# Patient Record
Sex: MALE | Race: WHITE | NOT HISPANIC OR LATINO | Employment: FULL TIME | URBAN - METROPOLITAN AREA
[De-identification: names, ages, dates, MRNs, and addresses within clinical notes are randomized per-mention and may not be internally consistent; named-entity substitution may affect disease eponyms.]

---
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
## 2022-04-01 RX ORDER — RIZATRIPTAN BENZOATE 10 MG/1
TABLET ORAL
COMMUNITY
Start: 2022-01-05 | End: 2022-08-18

## 2022-04-01 RX ORDER — ERGOCALCIFEROL 1.25 MG/1
CAPSULE ORAL
Qty: 12 CAPSULE | Refills: 2 | Status: SHIPPED | OUTPATIENT
Start: 2022-04-01 | End: 2022-08-18 | Stop reason: SDUPTHER

## 2022-04-01 RX ORDER — CYCLOBENZAPRINE HCL 5 MG
TABLET ORAL
Qty: 30 TABLET | Refills: 2 | Status: SHIPPED | OUTPATIENT
Start: 2022-04-01 | End: 2022-08-18

## 2022-04-01 RX ORDER — OMEPRAZOLE 20 MG/1
CAPSULE, DELAYED RELEASE ORAL
Qty: 30 CAPSULE | Refills: 1 | Status: SHIPPED | OUTPATIENT
Start: 2022-04-01 | End: 2023-01-10

## 2022-04-01 RX ORDER — CYCLOBENZAPRINE HCL 5 MG
TABLET ORAL
COMMUNITY
Start: 2022-01-05 | End: 2022-08-18

## 2022-04-01 RX ORDER — RIZATRIPTAN BENZOATE 10 MG/1
TABLET ORAL
Qty: 9 TABLET | Refills: 1 | Status: SHIPPED | OUTPATIENT
Start: 2022-04-01

## 2022-04-01 RX ORDER — ONDANSETRON 4 MG/1
TABLET, ORALLY DISINTEGRATING ORAL
COMMUNITY
Start: 2022-03-31 | End: 2023-01-10

## 2022-04-01 RX ORDER — AMOXICILLIN 500 MG/1
CAPSULE ORAL
COMMUNITY
Start: 2022-01-03 | End: 2022-08-18

## 2022-04-01 RX ORDER — ERGOCALCIFEROL 1.25 MG/1
CAPSULE ORAL
COMMUNITY
Start: 2022-02-04 | End: 2022-08-18

## 2022-04-01 RX ORDER — IBUPROFEN 800 MG/1
TABLET ORAL
COMMUNITY
Start: 2022-01-03

## 2022-04-01 RX ORDER — HYDROCODONE BITARTRATE AND ACETAMINOPHEN 10; 325 MG/1; MG/1
TABLET ORAL
COMMUNITY
Start: 2022-01-03 | End: 2022-08-18

## 2022-08-18 ENCOUNTER — OFFICE VISIT (OUTPATIENT)
Dept: MEDICAL GROUP | Facility: CLINIC | Age: 22
End: 2022-08-18
Payer: COMMERCIAL

## 2022-08-18 VITALS
HEART RATE: 74 BPM | DIASTOLIC BLOOD PRESSURE: 72 MMHG | OXYGEN SATURATION: 96 % | SYSTOLIC BLOOD PRESSURE: 126 MMHG | BODY MASS INDEX: 31.52 KG/M2 | HEIGHT: 72 IN | WEIGHT: 232.7 LBS

## 2022-08-18 DIAGNOSIS — Z86.39 HISTORY OF VITAMIN D DEFICIENCY: ICD-10-CM

## 2022-08-18 DIAGNOSIS — R73.09 ABNORMAL BLOOD SUGAR: ICD-10-CM

## 2022-08-18 DIAGNOSIS — E78.5 DYSLIPIDEMIA: ICD-10-CM

## 2022-08-18 DIAGNOSIS — G44.209 TENSION HEADACHE: ICD-10-CM

## 2022-08-18 PROBLEM — Z83.438 FAMILY HISTORY OF DYSLIPIDEMIA: Status: ACTIVE | Noted: 2022-08-18

## 2022-08-18 PROBLEM — Z83.438 FAMILY HISTORY OF DYSLIPIDEMIA: Status: RESOLVED | Noted: 2022-08-18 | Resolved: 2022-08-18

## 2022-08-18 PROCEDURE — 99204 OFFICE O/P NEW MOD 45 MIN: CPT | Performed by: STUDENT IN AN ORGANIZED HEALTH CARE EDUCATION/TRAINING PROGRAM

## 2022-08-18 RX ORDER — ALBUTEROL SULFATE 90 UG/1
2 AEROSOL, METERED RESPIRATORY (INHALATION) EVERY 4 HOURS PRN
Qty: 1 EACH | Refills: 3 | Status: SHIPPED | OUTPATIENT
Start: 2022-08-18 | End: 2023-03-26 | Stop reason: SDUPTHER

## 2022-08-18 RX ORDER — ERGOCALCIFEROL 1.25 MG/1
50000 CAPSULE ORAL
Qty: 12 CAPSULE | Refills: 3 | Status: SHIPPED | OUTPATIENT
Start: 2022-08-18 | End: 2023-03-26 | Stop reason: SDUPTHER

## 2022-08-18 RX ORDER — CETIRIZINE HYDROCHLORIDE 10 MG/1
10 TABLET ORAL DAILY
Qty: 90 TABLET | Refills: 3 | Status: SHIPPED | OUTPATIENT
Start: 2022-08-18

## 2022-08-18 SDOH — ECONOMIC STABILITY: TRANSPORTATION INSECURITY
IN THE PAST 12 MONTHS, HAS LACK OF TRANSPORTATION KEPT YOU FROM MEETINGS, WORK, OR FROM GETTING THINGS NEEDED FOR DAILY LIVING?: NO

## 2022-08-18 SDOH — ECONOMIC STABILITY: INCOME INSECURITY: IN THE LAST 12 MONTHS, WAS THERE A TIME WHEN YOU WERE NOT ABLE TO PAY THE MORTGAGE OR RENT ON TIME?: NO

## 2022-08-18 SDOH — HEALTH STABILITY: PHYSICAL HEALTH: ON AVERAGE, HOW MANY MINUTES DO YOU ENGAGE IN EXERCISE AT THIS LEVEL?: 120 MIN

## 2022-08-18 SDOH — HEALTH STABILITY: PHYSICAL HEALTH: ON AVERAGE, HOW MANY DAYS PER WEEK DO YOU ENGAGE IN MODERATE TO STRENUOUS EXERCISE (LIKE A BRISK WALK)?: 7 DAYS

## 2022-08-18 SDOH — ECONOMIC STABILITY: HOUSING INSECURITY
IN THE LAST 12 MONTHS, WAS THERE A TIME WHEN YOU DID NOT HAVE A STEADY PLACE TO SLEEP OR SLEPT IN A SHELTER (INCLUDING NOW)?: NO

## 2022-08-18 SDOH — ECONOMIC STABILITY: FOOD INSECURITY: WITHIN THE PAST 12 MONTHS, YOU WORRIED THAT YOUR FOOD WOULD RUN OUT BEFORE YOU GOT MONEY TO BUY MORE.: PATIENT DECLINED

## 2022-08-18 SDOH — ECONOMIC STABILITY: FOOD INSECURITY: WITHIN THE PAST 12 MONTHS, THE FOOD YOU BOUGHT JUST DIDN'T LAST AND YOU DIDN'T HAVE MONEY TO GET MORE.: PATIENT DECLINED

## 2022-08-18 SDOH — ECONOMIC STABILITY: HOUSING INSECURITY: IN THE LAST 12 MONTHS, HOW MANY PLACES HAVE YOU LIVED?: 1

## 2022-08-18 SDOH — ECONOMIC STABILITY: TRANSPORTATION INSECURITY
IN THE PAST 12 MONTHS, HAS LACK OF RELIABLE TRANSPORTATION KEPT YOU FROM MEDICAL APPOINTMENTS, MEETINGS, WORK OR FROM GETTING THINGS NEEDED FOR DAILY LIVING?: NO

## 2022-08-18 SDOH — ECONOMIC STABILITY: TRANSPORTATION INSECURITY
IN THE PAST 12 MONTHS, HAS THE LACK OF TRANSPORTATION KEPT YOU FROM MEDICAL APPOINTMENTS OR FROM GETTING MEDICATIONS?: NO

## 2022-08-18 SDOH — ECONOMIC STABILITY: INCOME INSECURITY: HOW HARD IS IT FOR YOU TO PAY FOR THE VERY BASICS LIKE FOOD, HOUSING, MEDICAL CARE, AND HEATING?: PATIENT DECLINED

## 2022-08-18 SDOH — HEALTH STABILITY: MENTAL HEALTH
STRESS IS WHEN SOMEONE FEELS TENSE, NERVOUS, ANXIOUS, OR CAN'T SLEEP AT NIGHT BECAUSE THEIR MIND IS TROUBLED. HOW STRESSED ARE YOU?: TO SOME EXTENT

## 2022-08-18 ASSESSMENT — SOCIAL DETERMINANTS OF HEALTH (SDOH)
HOW OFTEN DO YOU ATTEND CHURCH OR RELIGIOUS SERVICES?: PATIENT DECLINED
ARE YOU MARRIED, WIDOWED, DIVORCED, SEPARATED, NEVER MARRIED, OR LIVING WITH A PARTNER?: NEVER MARRIED
HOW OFTEN DO YOU ATTEND CHURCH OR RELIGIOUS SERVICES?: PATIENT DECLINED
DO YOU BELONG TO ANY CLUBS OR ORGANIZATIONS SUCH AS CHURCH GROUPS UNIONS, FRATERNAL OR ATHLETIC GROUPS, OR SCHOOL GROUPS?: NO
IN A TYPICAL WEEK, HOW MANY TIMES DO YOU TALK ON THE PHONE WITH FAMILY, FRIENDS, OR NEIGHBORS?: THREE TIMES A WEEK
HOW OFTEN DO YOU GET TOGETHER WITH FRIENDS OR RELATIVES?: TWICE A WEEK
HOW HARD IS IT FOR YOU TO PAY FOR THE VERY BASICS LIKE FOOD, HOUSING, MEDICAL CARE, AND HEATING?: PATIENT DECLINED
HOW OFTEN DO YOU ATTENT MEETINGS OF THE CLUB OR ORGANIZATION YOU BELONG TO?: PATIENT DECLINED
ARE YOU MARRIED, WIDOWED, DIVORCED, SEPARATED, NEVER MARRIED, OR LIVING WITH A PARTNER?: NEVER MARRIED
HOW MANY DRINKS CONTAINING ALCOHOL DO YOU HAVE ON A TYPICAL DAY WHEN YOU ARE DRINKING: PATIENT DECLINED
HOW OFTEN DO YOU HAVE SIX OR MORE DRINKS ON ONE OCCASION: PATIENT DECLINED
HOW OFTEN DO YOU ATTENT MEETINGS OF THE CLUB OR ORGANIZATION YOU BELONG TO?: PATIENT DECLINED
DO YOU BELONG TO ANY CLUBS OR ORGANIZATIONS SUCH AS CHURCH GROUPS UNIONS, FRATERNAL OR ATHLETIC GROUPS, OR SCHOOL GROUPS?: NO
IN A TYPICAL WEEK, HOW MANY TIMES DO YOU TALK ON THE PHONE WITH FAMILY, FRIENDS, OR NEIGHBORS?: THREE TIMES A WEEK
HOW OFTEN DO YOU GET TOGETHER WITH FRIENDS OR RELATIVES?: TWICE A WEEK
WITHIN THE PAST 12 MONTHS, YOU WORRIED THAT YOUR FOOD WOULD RUN OUT BEFORE YOU GOT THE MONEY TO BUY MORE: PATIENT DECLINED
HOW OFTEN DO YOU HAVE A DRINK CONTAINING ALCOHOL: PATIENT DECLINED

## 2022-08-18 ASSESSMENT — LIFESTYLE VARIABLES
HOW OFTEN DO YOU HAVE SIX OR MORE DRINKS ON ONE OCCASION: PATIENT DECLINED
HOW MANY STANDARD DRINKS CONTAINING ALCOHOL DO YOU HAVE ON A TYPICAL DAY: PATIENT DECLINED
AUDIT-C TOTAL SCORE: -1
HOW OFTEN DO YOU HAVE A DRINK CONTAINING ALCOHOL: PATIENT DECLINED
SKIP TO QUESTIONS 9-10: 0

## 2022-08-19 ASSESSMENT — ENCOUNTER SYMPTOMS
FOCAL WEAKNESS: 0
EYE REDNESS: 0
PALPITATIONS: 0
NAUSEA: 0
ABDOMINAL PAIN: 0
VOMITING: 0
HEADACHES: 1
SPEECH CHANGE: 0
CONSTIPATION: 0
WEIGHT LOSS: 0
FEVER: 0
PHOTOPHOBIA: 0
DIARRHEA: 0
SHORTNESS OF BREATH: 0
SENSORY CHANGE: 0
COUGH: 0
WEAKNESS: 0
EYE DISCHARGE: 0

## 2022-08-19 NOTE — PROGRESS NOTES
"Subjective:     CC:  Establish care    Diagnoses of Tension headache, BMI 31.0-31.9,adult, History of vitamin D deficiency, Dyslipidemia, and Abnormal blood sugar were pertinent to this visit.    HISTORY OF THE PRESENT ILLNESS: Patient is a 21 y.o. male. This pleasant patient is here today to establish care and discuss headaches, medication refills, and lab work. His/her prior PCP was unkown.    Problem   Tension Headache    Patient with chronic headaches, previously diagnosed as migraine headaches  Reports frequency of a couple times per month to a couple times per week  Has not noted any obvious triggers  Headaches are bilateral starting in the frontal area and moving to the occipital area with quality of \"inflammation\".  There is no associated nausea, emesis, photophobia, or phonophobia  Has tried multiple migraine meds including triptans without relief.  States that anti-inflammatory such as Advil seem to be the most effective for relieving headaches.     History of Vitamin D Deficiency    H/o vitamin D deficiency, takes 50,000 IU vitamin D weekly       Dyslipidemia    History of abnormal lipid panel     Bmi 31.0-31.9,Adult    Reviewed diet and physical activity  Regularly active with walking and lifting items at work      Abnormal Blood Sugar    H/o abnormal blood sugar  Patient asymptomatic, but concerned about risk of DM  BMI 31     Family History of Dyslipidemia (Resolved)       Past Medical History:   Diagnosis Date    Asthma     Dental disorder     loose tooth upper R     Tension headache 8/18/2022   Seasonal allergies    Current Outpatient Medications on File Prior to Visit   Medication Sig Dispense Refill    rizatriptan (MAXALT) 10 MG tablet 1 BY MOUTH AS NEEDED MIGRAINE 9 Tablet 1    omeprazole (PRILOSEC) 20 MG delayed-release capsule TAKE ONE CAPSULE BY MOUTH DAILY 30 Capsule 1    ibuprofen (MOTRIN) 800 MG Tab       ondansetron (ZOFRAN ODT) 4 MG TABLET DISPERSIBLE       loratadine (CLARITIN) 10 MG TABS " Take 10 mg by mouth every day.       No current facility-administered medications on file prior to visit.     Past Surgical History:   Procedure Laterality Date    TONSILLECTOMY AND ADENOIDECTOMY  7/16/2014    Performed by Miguel Oden M.D. at SURGERY SAME DAY [facility]    OTHER  2007    Hypospadias repair      History reviewed. No pertinent family history.    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   HENT:  Positive for congestion. Negative for ear pain.    Eyes:  Negative for photophobia, discharge and redness.   Respiratory:  Negative for cough and shortness of breath.    Cardiovascular:  Negative for chest pain and palpitations.   Gastrointestinal:  Negative for abdominal pain, constipation, diarrhea, nausea and vomiting.   Genitourinary:  Negative for dysuria, frequency and urgency.   Musculoskeletal:  Negative for joint pain.   Skin:  Negative for rash.   Neurological:  Positive for headaches. Negative for sensory change, speech change, focal weakness and weakness.       Objective:       Exam: /72 (BP Location: Right arm, Patient Position: Sitting, BP Cuff Size: Adult)   Pulse 74   Ht 1.829 m (6')   Wt 106 kg (232 lb 11.2 oz)   SpO2 96%  Body mass index is 31.56 kg/m².    Physical Exam  Vitals reviewed.   Constitutional:       General: He is not in acute distress.     Appearance: Normal appearance.   HENT:      Head: Normocephalic and atraumatic.      Right Ear: Tympanic membrane, ear canal and external ear normal.      Left Ear: Tympanic membrane, ear canal and external ear normal.      Nose: Nose normal.      Mouth/Throat:      Mouth: Mucous membranes are moist.      Pharynx: Oropharynx is clear. No  oropharyngeal exudate or posterior oropharyngeal erythema.      Comments: Tonsils surgically absent  Eyes:      Conjunctiva/sclera: Conjunctivae normal.      Pupils: Pupils are equal, round, and reactive to light.   Cardiovascular:      Rate and Rhythm: Normal rate and regular rhythm.      Pulses: Normal pulses.      Heart sounds: Normal heart sounds. No murmur heard.  Pulmonary:      Effort: Pulmonary effort is normal. No respiratory distress.      Breath sounds: Normal breath sounds. No wheezing, rhonchi or rales.   Abdominal:      General: Abdomen is flat. Bowel sounds are normal. There is no distension.      Palpations: Abdomen is soft. There is no mass.      Tenderness: There is no abdominal tenderness. There is no guarding.      Hernia: No hernia is present.   Musculoskeletal:         General: No swelling or deformity.      Cervical back: Normal range of motion and neck supple. No tenderness.   Lymphadenopathy:      Cervical: No cervical adenopathy.   Skin:     General: Skin is warm and dry.      Capillary Refill: Capillary refill takes less than 2 seconds.      Findings: No lesion or rash.   Neurological:      General: No focal deficit present.      Mental Status: He is alert.      Sensory: No sensory deficit.      Motor: No weakness.      Gait: Gait normal.   Psychiatric:         Mood and Affect: Mood normal.         Behavior: Behavior normal.         Thought Content: Thought content normal.           Assessment & Plan:   21 y.o. male with the following -    Problem List Items Addressed This Visit       Tension headache     Discontinue migraine medications  Okay to use acetaminophen or NSAIDs as needed           History of vitamin D deficiency     25-hydroxy vitamin D level per patient request and history         Relevant Orders    VITAMIN D,25 HYDROXY    Dyslipidemia     - lipid panel ordered         Relevant Orders    Lipid Profile    BMI 31.0-31.9,adult     Counseled patient about recommendations for  physical activity and heart healthy diet         Relevant Orders    HEMOGLOBIN A1C    Lipid Profile    Comp Metabolic Panel    Abnormal blood sugar     Hemoglobin A1C         Relevant Orders    HEMOGLOBIN A1C           No follow-ups on file.    RTC to review labs if abnormal; otherwise, needs annual visit or visits PRN

## 2022-09-23 ENCOUNTER — OFFICE VISIT (OUTPATIENT)
Dept: MEDICAL GROUP | Facility: CLINIC | Age: 22
End: 2022-09-23
Payer: COMMERCIAL

## 2022-09-23 VITALS
HEIGHT: 72 IN | DIASTOLIC BLOOD PRESSURE: 70 MMHG | SYSTOLIC BLOOD PRESSURE: 120 MMHG | TEMPERATURE: 97.8 F | OXYGEN SATURATION: 96 % | HEART RATE: 82 BPM | WEIGHT: 236.2 LBS | BODY MASS INDEX: 31.99 KG/M2

## 2022-09-23 DIAGNOSIS — R73.03 PRE-DIABETES: ICD-10-CM

## 2022-09-23 DIAGNOSIS — E78.1 HYPERTRIGLYCERIDEMIA: ICD-10-CM

## 2022-09-23 DIAGNOSIS — Z86.39 HISTORY OF VITAMIN D DEFICIENCY: ICD-10-CM

## 2022-09-23 PROCEDURE — 99214 OFFICE O/P EST MOD 30 MIN: CPT | Mod: GC | Performed by: STUDENT IN AN ORGANIZED HEALTH CARE EDUCATION/TRAINING PROGRAM

## 2022-09-23 RX ORDER — ATORVASTATIN CALCIUM 40 MG/1
40 TABLET, FILM COATED ORAL NIGHTLY
Qty: 90 TABLET | Refills: 3 | Status: SHIPPED | OUTPATIENT
Start: 2022-09-23 | End: 2023-07-24 | Stop reason: SDUPTHER

## 2022-09-23 NOTE — PROGRESS NOTES
Subjective:     CC: Lab follow up    HPI:   Atilio presents today with:    Problem   Hypertriglyceridemia    History of hyerglycemia noted in pediatric visits. Cholesterol panel ordered at last visit for preventative care reveals elevated TG to 742, HDL 20, and .    Patient is not currently taking any medications that may produce this side effect.     He has a family history of hypercholesterolemia in father and grandmother, however they initiated therapy for it in their 50's.     No history of early heart attack or stroke.      Pre-Diabetes    A1C 5.7 with history of elevated blood glucose in the past.      History of Vitamin D Deficiency    H/o vitamin D deficiency, takes 50,000 IU vitamin D weekly but has not taken in 9 months.     Levels low at 11.1           Current Outpatient Medications Ordered in Epic   Medication Sig Dispense Refill    cetirizine (ZYRTEC) 10 MG Tab Take 1 Tablet by mouth every day. 90 Tablet 3    vitamin D2, Ergocalciferol, (DRISDOL) 1.25 MG (70599 UT) Cap capsule Take 1 Capsule by mouth every 7 days. 12 Capsule 3    albuterol 108 (90 Base) MCG/ACT Aero Soln inhalation aerosol Inhale 2 Puffs every four hours as needed for Shortness of Breath. 1 Each 3    rizatriptan (MAXALT) 10 MG tablet 1 BY MOUTH AS NEEDED MIGRAINE 9 Tablet 1    omeprazole (PRILOSEC) 20 MG delayed-release capsule TAKE ONE CAPSULE BY MOUTH DAILY 30 Capsule 1    ibuprofen (MOTRIN) 800 MG Tab       ondansetron (ZOFRAN ODT) 4 MG TABLET DISPERSIBLE       loratadine (CLARITIN) 10 MG TABS Take 10 mg by mouth every day.       No current [facility]-ordered facility-administered medications on file.       ROS:  Gen: no fevers/chills, no changes in weight  Eyes: no changes in vision  ENT: no sore throat, no hearing loss, no bloody nose  Pulm: no sob, no cough  CV: no chest pain, no palpitations  GI: no nausea/vomiting, no diarrhea  : no dysuria  MSk: no myalgias  Skin: no rash  Neuro: no headaches, no  numbness/tingling  Heme/Lymph: no easy bruising      Objective:     Exam:  /70 (BP Location: Right arm, Patient Position: Sitting, BP Cuff Size: Adult)   Pulse 82   Temp 36.6 °C (97.8 °F) (Temporal)   Ht 1.829 m (6')   Wt 107 kg (236 lb 3.2 oz)   SpO2 96%   BMI 32.03 kg/m²  Body mass index is 32.03 kg/m².    Gen: Alert and oriented, No apparent distress.  Neck: Neck is supple without lymphadenopathy.  Lungs: Normal effort, CTA bilaterally, no wheezes, rhonchi, or rales  CV: Regular rate and rhythm. No murmurs, rubs, or gallops.  Ext: No clubbing, cyanosis, edema.      Labs:   Lab Results   Component Value Date/Time    CHOLSTRLTOT 197 09/20/2022 08:07 AM    HDL 20 (L) 09/20/2022 08:07 AM    TRIGLYCERIDE 742 (HH) 09/20/2022 08:07 AM       Lab Results   Component Value Date/Time    SODIUM 141 09/20/2022 08:07 AM    POTASSIUM 4.4 09/20/2022 08:07 AM    CHLORIDE 102 09/20/2022 08:07 AM    CO2 23 09/20/2022 08:07 AM    GLUCOSE 78 09/20/2022 08:07 AM    BUN 22 (H) 09/20/2022 08:07 AM    CREATININE 1.20 09/20/2022 08:07 AM    BUNCREATRAT 18 09/20/2022 08:07 AM     Lab Results   Component Value Date/Time    ALKPHOSPHAT 81 09/20/2022 08:07 AM    ASTSGOT 26 09/20/2022 08:07 AM    ALTSGPT 35 09/20/2022 08:07 AM    TBILIRUBIN <0.2 09/20/2022 08:07 AM      Vitamin D level is low at 11.1    A1C 5.7      Assessment & Plan:     21 y.o. male with the following -     Problem List Items Addressed This Visit       History of vitamin D deficiency     Counseled patient on importance of continuing vitamin D supplementation.          Hypertriglyceridemia     Hypertriglyceridemia at age 21 years old is highly suggestive of genetic/familial disease.     Discussed at length lifestyle modifications including initiating of daily exercise at least 50 min/week of vigorous exercise or 75 min per week of moderate intensity exercise.     Discussed dietary modifications in detail highlighting decrease in fat intake, saturated fat intake,  and decrease in frequency of meals eaten out as well as basics of reading nutrition labels. I have placed referral for nutritionist consultation.     Referral to genetic studies HNP and [facility]    Initiation of atorvastatin 40 mg    Consider addition of fibrate and or omega 3 FA if not responsive to escalating doses of statin         Relevant Orders    Referral to Nutrition Services    Referral to Genetic Research Studies    Pre-diabetes     See lifestyle counseling under A&P for hypertriglyceridemia

## 2022-09-23 NOTE — ASSESSMENT & PLAN NOTE
Hypertriglyceridemia at age 21 years old is highly suggestive of genetic/familial disease.     Discussed at length lifestyle modifications including initiating of daily exercise at least 50 min/week of vigorous exercise or 75 min per week of moderate intensity exercise.     Discussed dietary modifications in detail highlighting decrease in fat intake, saturated fat intake, and decrease in frequency of meals eaten out as well as basics of reading nutrition labels. I have placed referral for nutritionist consultation.     Referral to genetic studies HNP and [facility]    Initiation of atorvastatin 40 mg    Consider addition of fibrate and or omega 3 FA if not responsive to escalating doses of statin

## 2022-09-26 ENCOUNTER — RESEARCH ENCOUNTER (OUTPATIENT)
Dept: MEDICAL GROUP | Facility: PHYSICIAN GROUP | Age: 22
End: 2022-09-26
Attending: STUDENT IN AN ORGANIZED HEALTH CARE EDUCATION/TRAINING PROGRAM
Payer: COMMERCIAL

## 2022-09-26 DIAGNOSIS — Z00.6 RESEARCH STUDY PATIENT: ICD-10-CM

## 2022-09-26 DIAGNOSIS — E78.1 HYPERTRIGLYCERIDEMIA: ICD-10-CM

## 2022-09-26 NOTE — LETTER
January 6, 2023         Atilio Grier III  [address] [facility] [address]        Dear Atilio:      Below are the results from your recent visit:    No results found from the In Basket message.  The test results show that ***.    If you have any questions or concerns, please don't hesitate to call.        Sincerely,      Liane Escalante M.D.    Electronically Signed

## 2022-09-30 DIAGNOSIS — E78.1 HYPERTRIGLYCERIDEMIA: ICD-10-CM

## 2022-09-30 NOTE — PROGRESS NOTES
Patient is having trouble acquiring the lab orders from our visit on 9/26/2020    I have placed another set of orders for lipid panels; one to be completed as soon as patient is able, and a second order to be completed after starting the statin therapy for a few weeks prior to our next appointment.     I have also placed an order for the CBC such that the patient may participate in the [employer] [place] [facility] and [facility] study.     These are the labs discussed in the appointment and they are not urgent.

## 2022-10-20 LAB
APOB+LDLR+PCSK9 GENE MUT ANL BLD/T: NOT DETECTED
BRCA1+BRCA2 DEL+DUP + FULL MUT ANL BLD/T: NOT DETECTED
MLH1+MSH2+MSH6+PMS2 GN DEL+DUP+FUL M: NOT DETECTED

## 2022-11-09 ENCOUNTER — OFFICE VISIT (OUTPATIENT)
Dept: MEDICAL GROUP | Facility: CLINIC | Age: 22
End: 2022-11-09
Payer: COMMERCIAL

## 2022-11-09 VITALS
OXYGEN SATURATION: 95 % | BODY MASS INDEX: 30.69 KG/M2 | DIASTOLIC BLOOD PRESSURE: 79 MMHG | WEIGHT: 226.6 LBS | HEART RATE: 62 BPM | SYSTOLIC BLOOD PRESSURE: 132 MMHG | HEIGHT: 72 IN

## 2022-11-09 DIAGNOSIS — E78.1 HYPERTRIGLYCERIDEMIA: ICD-10-CM

## 2022-11-09 PROCEDURE — 99212 OFFICE O/P EST SF 10 MIN: CPT | Performed by: STUDENT IN AN ORGANIZED HEALTH CARE EDUCATION/TRAINING PROGRAM

## 2022-11-09 NOTE — ASSESSMENT & PLAN NOTE
Encourage continuing atorvastatin 40 mg  Repeat lipid panel in December  Discussed lifestyle modifications that can improve as discussed under BMI 30.0-30.9

## 2022-11-10 ASSESSMENT — ENCOUNTER SYMPTOMS
FEVER: 0
SHORTNESS OF BREATH: 0
COUGH: 0
SENSORY CHANGE: 0
SORE THROAT: 0
FOCAL WEAKNESS: 0

## 2022-11-10 NOTE — PROGRESS NOTES
Subjective:     CC: f/u hypertriglyceridemia    HPI:   Atilio presents today with    Problem   Hypertriglyceridemia    History of hyerglycemia noted in pediatric visits. Cholesterol panel ordered at last visit for preventative care reveals elevated TG to 742, HDL 20, and .    Patient started on atorvastatin 40 mg at prior appt and tolerating.  Repeat lipid panel improved with T, HDL 26, , LDL 70    He has a family history of hypercholesterolemia in father and grandmother, however they initiated therapy for it in their 50's.     No history of early heart attack or stroke.      Bmi 30.0-30.9,Adult    Reviewed diet and physical activity  Regularly active with walking and lifting items at work   Made dietary changes at home, but eating fast food outside the home. Denies sugar-sweetened beverages, regular snacking, or other highly-processed foods other than fast food for meals outside of home.           ROS:  Review of Systems   Constitutional:  Negative for fever.   HENT:  Negative for congestion and sore throat.    Respiratory:  Negative for cough and shortness of breath.    Cardiovascular:  Negative for chest pain and leg swelling.   Neurological:  Negative for sensory change and focal weakness.     Objective:     Exam:  /79 (BP Location: Right arm, Patient Position: Sitting, BP Cuff Size: Adult)   Pulse 62   Ht 1.829 m (6')   Wt 103 kg (226 lb 9.6 oz)   SpO2 95%   BMI 30.73 kg/m²  Body mass index is 30.73 kg/m².    Physical Exam  Constitutional:       General: He is not in acute distress.     Appearance: Normal appearance. He is not ill-appearing.   HENT:      Right Ear: External ear normal.      Left Ear: External ear normal.   Eyes:      Conjunctiva/sclera: Conjunctivae normal.   Pulmonary:      Effort: Pulmonary effort is normal. No respiratory distress.   Skin:     Coloration: Skin is not pale.      Findings: No rash.   Neurological:      General: No focal deficit present.      Mental  Status: He is alert.      Gait: Gait normal.   Psychiatric:         Mood and Affect: Mood normal.         Behavior: Behavior normal.         Thought Content: Thought content normal.         Judgment: Judgment normal.         Assessment & Plan:     22 y.o. male with the following -     Problem List Items Addressed This Visit       BMI 30.0-30.9,adult     - Recommend heart healthy diet, try limiting fast food.  - Continue activity at work, try to incorporate more regular physical activity throughout day         Hypertriglyceridemia     Encourage continuing atorvastatin 40 mg  Repeat lipid panel in December  Discussed lifestyle modifications that can improve as discussed under BMI 30.0-30.9                    No follow-ups on file.

## 2022-11-10 NOTE — ASSESSMENT & PLAN NOTE
- Recommend heart healthy diet, try limiting fast food.  - Continue activity at work, try to incorporate more regular physical activity throughout day

## 2022-12-19 ENCOUNTER — OFFICE VISIT (OUTPATIENT)
Dept: MEDICAL GROUP | Facility: CLINIC | Age: 22
End: 2022-12-19
Payer: COMMERCIAL

## 2022-12-19 VITALS
HEART RATE: 92 BPM | TEMPERATURE: 98.2 F | BODY MASS INDEX: 31.92 KG/M2 | HEIGHT: 71 IN | DIASTOLIC BLOOD PRESSURE: 80 MMHG | OXYGEN SATURATION: 95 % | SYSTOLIC BLOOD PRESSURE: 122 MMHG | WEIGHT: 228 LBS

## 2022-12-19 DIAGNOSIS — E78.5 DYSLIPIDEMIA: ICD-10-CM

## 2022-12-19 DIAGNOSIS — E78.1 HYPERTRIGLYCERIDEMIA: ICD-10-CM

## 2022-12-19 PROCEDURE — 99213 OFFICE O/P EST LOW 20 MIN: CPT | Mod: GE | Performed by: STUDENT IN AN ORGANIZED HEALTH CARE EDUCATION/TRAINING PROGRAM

## 2022-12-19 ASSESSMENT — PATIENT HEALTH QUESTIONNAIRE - PHQ9: CLINICAL INTERPRETATION OF PHQ2 SCORE: 0

## 2022-12-20 NOTE — PROGRESS NOTES
Subjective:     CC: Lab follow up    HPI:   Atilio presents today with:    Problem   Hypertriglyceridemia    History of hyerglycemia noted in pediatric visits. Cholesterol panel  shows elevated TG to 742, HDL 20, and .    Patient started on atorvastatin 40 mg at prior appt and tolerating.  Repeat lipid panel improved with T, HDL 26, , LDL 70    He has a family history of hypercholesterolemia in father and grandmother, however they initiated therapy for it in their 50's.     No history of early heart attack or stroke.     Genetic studies completed and are negative for heritable familial hypercholesterolemia. Repeat labs show overall worsening    Latest Reference Range & Units 22 11:47   Cholesterol,Tot 100 - 199 mg/dL 185   Triglycerides 0 - 149 mg/dL 242 (H)   HDL >39 mg/dL 26 (L)   LDL Chol Calc (NIH) 0 - 99 mg/dL 116 (H)   VLDL Cholesterol Calc 5 - 40 mg/dL 43 (H)   (H): Data is abnormally high  (L): Data is abnormally low    Patient reports prior to this lab draw he had self discontinued his medication to see if he really did indeed need to take it.          Current Outpatient Medications Ordered in Epic   Medication Sig Dispense Refill    atorvastatin (LIPITOR) 40 MG Tab Take 1 Tablet by mouth every evening. 90 Tablet 3    cetirizine (ZYRTEC) 10 MG Tab Take 1 Tablet by mouth every day. 90 Tablet 3    vitamin D2, Ergocalciferol, (DRISDOL) 1.25 MG (13990 UT) Cap capsule Take 1 Capsule by mouth every 7 days. 12 Capsule 3    albuterol 108 (90 Base) MCG/ACT Aero Soln inhalation aerosol Inhale 2 Puffs every four hours as needed for Shortness of Breath. 1 Each 3    rizatriptan (MAXALT) 10 MG tablet 1 BY MOUTH AS NEEDED MIGRAINE 9 Tablet 1    ibuprofen (MOTRIN) 800 MG Tab        No current Epic-ordered facility-administered medications on file.         ROS:  Gen: no fevers/chills, no changes in weight  Eyes: no changes in vision  ENT: no sore throat, no hearing loss, no bloody nose  Pulm: no  "sob, no cough  CV: no chest pain, no palpitations  GI: no nausea/vomiting, no diarrhea  : no dysuria  MSk: no myalgias  Skin: no rash  Neuro: no headaches, no numbness/tingling  Heme/Lymph: no easy bruising      Objective:     Exam:  /80   Pulse 92   Temp 36.8 °C (98.2 °F)   Ht 1.803 m (5' 11\")   Wt 103 kg (228 lb)   SpO2 95%   BMI 31.80 kg/m²  Body mass index is 31.8 kg/m².    Gen: Alert and oriented, No apparent distress.  Neck: Neck is supple without lymphadenopathy.  Lungs: Normal effort, CTA bilaterally, no wheezes, rhonchi, or rales  CV: Regular rate and rhythm. No murmurs, rubs, or gallops.  Ext: No clubbing, cyanosis, edema.      Labs:    Latest Reference Range & Units 12/13/22 11:47   Cholesterol,Tot 100 - 199 mg/dL 185   Triglycerides 0 - 149 mg/dL 242 (H)   HDL >39 mg/dL 26 (L)   LDL Chol Calc (NIH) 0 - 99 mg/dL 116 (H)   VLDL Cholesterol Calc 5 - 40 mg/dL 43 (H)   (H): Data is abnormally high  (L): Data is abnormally low    Assessment & Plan:     22 y.o. male with the following -     Problem List Items Addressed This Visit       Dyslipidemia    Relevant Orders    CHOLESTEROL    Hypertriglyceridemia     Restart Atorvastatin 40 mg daily. Room to increase to 80mg daily if not effective although I suspect with compliance he will have measurable success given his initial response to medication. Patient now feels good about taking medication daily. He would not like to add on any adjunct therapies at this time.     We discussed Niacin as an option should low HDL become a pervasive issue in the future. Plans to follow up with dietitian/nutritionist as detailed in previous visits. Has not yet seen but now has an appointment. Reinforced lifestyle changes at today's visit.     If control remains difficult despite dietary/lifestyle modification and statin medication we could consider referral to vascular specialist for further workup.             Return in about 3 months (around 3/19/2023).    "

## 2022-12-20 NOTE — ASSESSMENT & PLAN NOTE
Restart Atorvastatin 40 mg daily. Room to increase to 80mg daily if not effective although I suspect with compliance he will have measurable success given his initial response to medication. Patient now feels good about taking medication daily. He would not like to add on any adjunct therapies at this time.     We discussed Niacin as an option should low HDL become a pervasive issue in the future. Plans to follow up with dietitian/nutritionist as detailed in previous visits. Has not yet seen but now has an appointment. Reinforced lifestyle changes at today's visit.     If control remains difficult despite dietary/lifestyle modification and statin medication we could consider referral to vascular specialist for further workup.

## 2023-02-24 NOTE — PROGRESS NOTES
Attempting to close this very old encounter than Dr. Mary has been unable to clear from her inbox.

## 2023-03-17 ENCOUNTER — HOSPITAL ENCOUNTER (OUTPATIENT)
Dept: LAB | Facility: MEDICAL CENTER | Age: 23
End: 2023-03-17
Attending: STUDENT IN AN ORGANIZED HEALTH CARE EDUCATION/TRAINING PROGRAM
Payer: COMMERCIAL

## 2023-03-17 DIAGNOSIS — E78.5 DYSLIPIDEMIA: ICD-10-CM

## 2023-03-17 DIAGNOSIS — E78.1 HYPERTRIGLYCERIDEMIA: ICD-10-CM

## 2023-03-17 LAB
ALBUMIN SERPL BCP-MCNC: 4.6 G/DL (ref 3.2–4.9)
ALBUMIN/GLOB SERPL: 1.8 G/DL
ALP SERPL-CCNC: 79 U/L (ref 30–99)
ALT SERPL-CCNC: 34 U/L (ref 2–50)
ANION GAP SERPL CALC-SCNC: 9 MMOL/L (ref 7–16)
AST SERPL-CCNC: 31 U/L (ref 12–45)
BASOPHILS # BLD AUTO: 0.8 % (ref 0–1.8)
BASOPHILS # BLD: 0.05 K/UL (ref 0–0.12)
BILIRUB SERPL-MCNC: 0.2 MG/DL (ref 0.1–1.5)
BUN SERPL-MCNC: 13 MG/DL (ref 8–22)
CALCIUM ALBUM COR SERPL-MCNC: 8.8 MG/DL (ref 8.5–10.5)
CALCIUM SERPL-MCNC: 9.3 MG/DL (ref 8.5–10.5)
CHLORIDE SERPL-SCNC: 106 MMOL/L (ref 96–112)
CHOLEST SERPL-MCNC: 166 MG/DL (ref 100–199)
CO2 SERPL-SCNC: 23 MMOL/L (ref 20–33)
CREAT SERPL-MCNC: 0.66 MG/DL (ref 0.5–1.4)
EOSINOPHIL # BLD AUTO: 0.09 K/UL (ref 0–0.51)
EOSINOPHIL NFR BLD: 1.4 % (ref 0–6.9)
ERYTHROCYTE [DISTWIDTH] IN BLOOD BY AUTOMATED COUNT: 43.2 FL (ref 35.9–50)
GFR SERPLBLD CREATININE-BSD FMLA CKD-EPI: 136 ML/MIN/1.73 M 2
GLOBULIN SER CALC-MCNC: 2.5 G/DL (ref 1.9–3.5)
GLUCOSE SERPL-MCNC: 97 MG/DL (ref 65–99)
HCT VFR BLD AUTO: 48.1 % (ref 42–52)
HDLC SERPL-MCNC: 26 MG/DL
HGB BLD-MCNC: 16.4 G/DL (ref 14–18)
IMM GRANULOCYTES # BLD AUTO: 0.03 K/UL (ref 0–0.11)
IMM GRANULOCYTES NFR BLD AUTO: 0.5 % (ref 0–0.9)
LDLC SERPL CALC-MCNC: 80 MG/DL
LYMPHOCYTES # BLD AUTO: 2.24 K/UL (ref 1–4.8)
LYMPHOCYTES NFR BLD: 35.8 % (ref 22–41)
MCH RBC QN AUTO: 31 PG (ref 27–33)
MCHC RBC AUTO-ENTMCNC: 34.1 G/DL (ref 33.7–35.3)
MCV RBC AUTO: 90.9 FL (ref 81.4–97.8)
MONOCYTES # BLD AUTO: 0.39 K/UL (ref 0–0.85)
MONOCYTES NFR BLD AUTO: 6.2 % (ref 0–13.4)
NEUTROPHILS # BLD AUTO: 3.46 K/UL (ref 1.82–7.42)
NEUTROPHILS NFR BLD: 55.3 % (ref 44–72)
NRBC # BLD AUTO: 0 K/UL
NRBC BLD-RTO: 0 /100 WBC
PLATELET # BLD AUTO: 245 K/UL (ref 164–446)
PMV BLD AUTO: 11.6 FL (ref 9–12.9)
POTASSIUM SERPL-SCNC: 4.2 MMOL/L (ref 3.6–5.5)
PROT SERPL-MCNC: 7.1 G/DL (ref 6–8.2)
RBC # BLD AUTO: 5.29 M/UL (ref 4.7–6.1)
SODIUM SERPL-SCNC: 138 MMOL/L (ref 135–145)
TRIGL SERPL-MCNC: 301 MG/DL (ref 0–149)
WBC # BLD AUTO: 6.3 K/UL (ref 4.8–10.8)

## 2023-03-17 PROCEDURE — 80053 COMPREHEN METABOLIC PANEL: CPT

## 2023-03-17 PROCEDURE — 85025 COMPLETE CBC W/AUTO DIFF WBC: CPT

## 2023-03-17 PROCEDURE — 80061 LIPID PANEL: CPT

## 2023-03-24 ENCOUNTER — TELEMEDICINE (OUTPATIENT)
Dept: MEDICAL GROUP | Facility: CLINIC | Age: 23
End: 2023-03-24
Payer: COMMERCIAL

## 2023-03-24 DIAGNOSIS — E78.1 HYPERTRIGLYCERIDEMIA: ICD-10-CM

## 2023-03-24 PROCEDURE — 99442 PR PHYSICIAN TELEPHONE EVALUATION 11-20 MIN: CPT | Mod: GE | Performed by: STUDENT IN AN ORGANIZED HEALTH CARE EDUCATION/TRAINING PROGRAM

## 2023-03-24 NOTE — ASSESSMENT & PLAN NOTE
Continue atorvastatin 40  Discussed compliance and lifestyle modifications   Still plans to attend nutrition visit (referral placed at prior visits)     RTC in 3-6 months

## 2023-03-24 NOTE — LETTER
March 24, 2023    To whom it may concern,    Atilio Grier was evaluated by myself at [facility] clinic on 03/24 for an ongoing illness. He should be excused from work for the dates fo 3/18/23 through 3/31/23 for this illness.     If you have any questions please feel free to reach out to my office.                                 Liane Escalante M.D.

## 2023-03-24 NOTE — PROGRESS NOTES
Virtual Visit: New Patient   This visit was conducted via  telephone    The patient was in their home in the [facility].    The patient's identity was confirmed and verbal consent was obtained for this virtual visit.     This call lasted 15 minutes    Subjective:     CC: hyperlipidemia  Atilio Grier III is a 22 y.o. male presenting to establish care and to discuss the evaluation and management of:    History of hyerglycemia noted in pediatric visits. Cholesterol panel  shows elevated TG to 742, HDL 20, and .    Patient started on atorvastatin 40 mg and tolerating well.  Repeat lipid panel improved with T, HDL 26, , LDL 70. Most recently TG at 302. Some inconsistencies in taking medication. Still working on lifestyle modifications and admits that his diet is not where it should be    He has a family history of hypercholesterolemia in father and grandmother, however they initiated therapy for it in their 50's.     No history of early heart attack or stroke.     Genetic studies completed and are negative for heritable familial hypercholesterolemia.         ROS  See HPI    Current medicines (including changes today)  Current Outpatient Medications   Medication Sig Dispense Refill    atorvastatin (LIPITOR) 40 MG Tab Take 1 Tablet by mouth every evening. 90 Tablet 3    cetirizine (ZYRTEC) 10 MG Tab Take 1 Tablet by mouth every day. 90 Tablet 3    vitamin D2, Ergocalciferol, (DRISDOL) 1.25 MG (60803 UT) Cap capsule Take 1 Capsule by mouth every 7 days. 12 Capsule 3    albuterol 108 (90 Base) MCG/ACT Aero Soln inhalation aerosol Inhale 2 Puffs every four hours as needed for Shortness of Breath. 1 Each 3    rizatriptan (MAXALT) 10 MG tablet 1 BY MOUTH AS NEEDED MIGRAINE 9 Tablet 1    ibuprofen (MOTRIN) 800 MG Tab        No current facility-administered medications for this visit.       Patient Active Problem List    Diagnosis Date Noted    Hypertriglyceridemia 2022    Pre-diabetes  09/23/2022    Tension headache 08/18/2022    History of vitamin D deficiency 08/18/2022    Dyslipidemia 08/18/2022    BMI 30.0-30.9,adult 08/18/2022    Abnormal blood sugar 08/18/2022    Hypertrophy of tonsils with hypertrophy of adenoids 07/16/2014        Objective:   Labs: Reviewed all with patient     Latest Reference Range & Units 03/17/23 15:04   WBC 4.8 - 10.8 K/uL 6.3   RBC 4.70 - 6.10 M/uL 5.29   Hemoglobin 14.0 - 18.0 g/dL 16.4   Hematocrit 42.0 - 52.0 % 48.1   MCV 81.4 - 97.8 fL 90.9   MCH 27.0 - 33.0 pg 31.0   MCHC 33.7 - 35.3 g/dL 34.1   RDW 35.9 - 50.0 fL 43.2   Platelet Count 164 - 446 K/uL 245   MPV 9.0 - 12.9 fL 11.6   Neutrophils-Polys 44.00 - 72.00 % 55.30   Neutrophils (Absolute) 1.82 - 7.42 K/uL 3.46   Lymphocytes 22.00 - 41.00 % 35.80   Lymphs (Absolute) 1.00 - 4.80 K/uL 2.24   Monocytes 0.00 - 13.40 % 6.20   Monos (Absolute) 0.00 - 0.85 K/uL 0.39   Eosinophils 0.00 - 6.90 % 1.40   Eos (Absolute) 0.00 - 0.51 K/uL 0.09   Basophils 0.00 - 1.80 % 0.80   Baso (Absolute) 0.00 - 0.12 K/uL 0.05   Immature Granulocytes 0.00 - 0.90 % 0.50   Immature Granulocytes (abs) 0.00 - 0.11 K/uL 0.03   Nucleated RBC /100 WBC 0.00   NRBC (Absolute) K/uL 0.00   Sodium 135 - 145 mmol/L 138   Potassium 3.6 - 5.5 mmol/L 4.2   Chloride 96 - 112 mmol/L 106   Co2 20 - 33 mmol/L 23   Anion Gap 7.0 - 16.0  9.0   Glucose 65 - 99 mg/dL 97   Bun 8 - 22 mg/dL 13   Creatinine 0.50 - 1.40 mg/dL 0.66   GFR (CKD-EPI) >60 mL/min/1.73 m 2 136   Calcium 8.5 - 10.5 mg/dL 9.3   Correct Calcium 8.5 - 10.5 mg/dL 8.8   AST(SGOT) 12 - 45 U/L 31   ALT(SGPT) 2 - 50 U/L 34   Alkaline Phosphatase 30 - 99 U/L 79   Total Bilirubin 0.1 - 1.5 mg/dL 0.2   Albumin 3.2 - 4.9 g/dL 4.6   Total Protein 6.0 - 8.2 g/dL 7.1   Globulin 1.9 - 3.5 g/dL 2.5   A-G Ratio g/dL 1.8   Cholesterol,Tot 100 - 199 mg/dL 166   Triglycerides 0 - 149 mg/dL 301 (H)   HDL >=40 mg/dL 26 !   LDL <100 mg/dL 80   (H): Data is abnormally high  !: Data is abnormal    Assessment and  Plan:   The following treatment plan was discussed:     1. Hypertriglyceridemia  Doing well. Reinforced lifestyle modifications  Continue atorvastatin 40  Discussed compliance and lifestyle modifications   Still plans to attend nutrition visit (referral placed at prior visits)     RTC in 3-6 months     This call lasted 15 minutes

## 2023-03-28 RX ORDER — ALBUTEROL SULFATE 90 UG/1
2 AEROSOL, METERED RESPIRATORY (INHALATION) EVERY 4 HOURS PRN
Qty: 1 EACH | Refills: 3 | Status: SHIPPED | OUTPATIENT
Start: 2023-03-28

## 2023-03-28 RX ORDER — ERGOCALCIFEROL 1.25 MG/1
50000 CAPSULE ORAL
Qty: 12 CAPSULE | Refills: 3 | Status: SHIPPED | OUTPATIENT
Start: 2023-03-28 | End: 2023-05-18 | Stop reason: SDUPTHER

## 2023-05-18 RX ORDER — ERGOCALCIFEROL 1.25 MG/1
50000 CAPSULE ORAL
Qty: 12 CAPSULE | Refills: 3 | Status: SHIPPED | OUTPATIENT
Start: 2023-05-18

## 2023-06-22 ENCOUNTER — OFFICE VISIT (OUTPATIENT)
Dept: MEDICAL GROUP | Facility: CLINIC | Age: 23
End: 2023-06-22
Payer: COMMERCIAL

## 2023-06-22 DIAGNOSIS — Z13.1 SCREENING FOR DIABETES MELLITUS: ICD-10-CM

## 2023-06-22 DIAGNOSIS — E78.1 HYPERTRIGLYCERIDEMIA: ICD-10-CM

## 2023-06-22 DIAGNOSIS — Z83.49 FAMILY HISTORY OF HYPOTHYROIDISM: ICD-10-CM

## 2023-06-22 PROCEDURE — 99213 OFFICE O/P EST LOW 20 MIN: CPT | Mod: GE | Performed by: STUDENT IN AN ORGANIZED HEALTH CARE EDUCATION/TRAINING PROGRAM

## 2023-06-22 ASSESSMENT — FIBROSIS 4 INDEX: FIB4 SCORE: 0.46

## 2023-06-22 ASSESSMENT — PATIENT HEALTH QUESTIONNAIRE - PHQ9: CLINICAL INTERPRETATION OF PHQ2 SCORE: 0

## 2023-06-23 NOTE — ASSESSMENT & PLAN NOTE
Labs ordered for next visit. Follow up in 3 months.   Screening for T2DM and thyroid also ordered    Consider fenofibrate in the future if TG still elevated despite statin, diet and lifestyle modifications

## 2023-06-23 NOTE — PROGRESS NOTES
Subjective:     CC: follow up    HPI:   Atilio presents today with:    Problem   Hypertriglyceridemia    History of hyerglycemia noted in pediatric visits. Cholesterol panel 09/22 shows elevated TG to 742, HDL 20, and .    Patient started on atorvastatin 40 mg and tolerating well.  Most recently TG at 302. Some inconsistencies in taking medication. Still working on lifestyle modifications and admits that his diet is not where it should be, but it has been consistently improving. He has noted 10 lbs weight loss since starting his new job which keeps him very active.     He has a family history of hypercholesterolemia in father and grandmother, however they initiated therapy for it in their 50's.     No history of early heart attack or stroke.     Genetic studies completed and are negative for heritable familial hypercholesterolemia.              Current Outpatient Medications Ordered in Epic   Medication Sig Dispense Refill    vitamin D2, Ergocalciferol, (DRISDOL) 1.25 MG (31807 UT) Cap capsule Take 1 Capsule by mouth every 7 days. 12 Capsule 3    albuterol 108 (90 Base) MCG/ACT Aero Soln inhalation aerosol Inhale 2 Puffs every four hours as needed for Shortness of Breath. 1 Each 3    atorvastatin (LIPITOR) 40 MG Tab Take 1 Tablet by mouth every evening. 90 Tablet 3    rizatriptan (MAXALT) 10 MG tablet 1 BY MOUTH AS NEEDED MIGRAINE 9 Tablet 1    cetirizine (ZYRTEC) 10 MG Tab Take 1 Tablet by mouth every day. 90 Tablet 3    ibuprofen (MOTRIN) 800 MG Tab  (Patient not taking: Reported on 6/22/2023)       No current [facility]-ordered facility-administered medications on file.         ROS:  Gen: no fevers/chills, no changes in weight  Eyes: no changes in vision  ENT: no sore throat, no hearing loss, no bloody nose  Pulm: no sob, no cough  CV: no chest pain, no palpitations  GI: no nausea/vomiting, no diarrhea  : no dysuria  MSk: no myalgias  Skin: no rash  Neuro: no headaches, no numbness/tingling  Heme/Lymph: no easy  "bruising      Objective:     Exam:  BP (P) 122/76 (BP Location: Left arm, Patient Position: Sitting, BP Cuff Size: Adult)   Pulse (P) 71   Temp (P) 36.6 °C (97.8 °F) (Temporal)   Ht (P) 1.778 m (5' 10\")   Wt (P) 96.6 kg (213 lb)   SpO2 (P) 95%   BMI (P) 30.56 kg/m²  Body mass index is 30.56 kg/m² (pended).    Gen: Alert and oriented, No apparent distress.  Neck: Neck is supple without lymphadenopathy.  Lungs: Normal effort, CTA bilaterally, no wheezes, rhonchi, or rales  CV: Regular rate and rhythm. No murmurs, rubs, or gallops.  Ext: No clubbing, cyanosis, edema.      Labs:    Latest Reference Range & Units 03/17/23 15:04   WBC 4.8 - 10.8 K/uL 6.3   RBC 4.70 - 6.10 M/uL 5.29   Hemoglobin 14.0 - 18.0 g/dL 16.4   Hematocrit 42.0 - 52.0 % 48.1   MCV 81.4 - 97.8 fL 90.9   MCH 27.0 - 33.0 pg 31.0   MCHC 33.7 - 35.3 g/dL 34.1   RDW 35.9 - 50.0 fL 43.2   Platelet Count 164 - 446 K/uL 245   MPV 9.0 - 12.9 fL 11.6   Neutrophils-Polys 44.00 - 72.00 % 55.30   Neutrophils (Absolute) 1.82 - 7.42 K/uL 3.46   Lymphocytes 22.00 - 41.00 % 35.80   Lymphs (Absolute) 1.00 - 4.80 K/uL 2.24   Monocytes 0.00 - 13.40 % 6.20   Monos (Absolute) 0.00 - 0.85 K/uL 0.39   Eosinophils 0.00 - 6.90 % 1.40   Eos (Absolute) 0.00 - 0.51 K/uL 0.09   Basophils 0.00 - 1.80 % 0.80   Baso (Absolute) 0.00 - 0.12 K/uL 0.05   Immature Granulocytes 0.00 - 0.90 % 0.50   Immature Granulocytes (abs) 0.00 - 0.11 K/uL 0.03   Nucleated RBC /100 WBC 0.00   NRBC (Absolute) K/uL 0.00   Sodium 135 - 145 mmol/L 138   Potassium 3.6 - 5.5 mmol/L 4.2   Chloride 96 - 112 mmol/L 106   Co2 20 - 33 mmol/L 23   Anion Gap 7.0 - 16.0  9.0   Glucose 65 - 99 mg/dL 97   Bun 8 - 22 mg/dL 13   Creatinine 0.50 - 1.40 mg/dL 0.66   GFR (CKD-EPI) >60 mL/min/1.73 m 2 136   Calcium 8.5 - 10.5 mg/dL 9.3   Correct Calcium 8.5 - 10.5 mg/dL 8.8   AST(SGOT) 12 - 45 U/L 31   ALT(SGPT) 2 - 50 U/L 34   Alkaline Phosphatase 30 - 99 U/L 79   Total Bilirubin 0.1 - 1.5 mg/dL 0.2   Albumin 3.2 - " 4.9 g/dL 4.6   Total Protein 6.0 - 8.2 g/dL 7.1   Globulin 1.9 - 3.5 g/dL 2.5   A-G Ratio g/dL 1.8   Cholesterol,Tot 100 - 199 mg/dL 166   Triglycerides 0 - 149 mg/dL 301 (H)   HDL >=40 mg/dL 26 !   LDL <100 mg/dL 80   (H): Data is abnormally high  !: Data is abnormal    Assessment & Plan:     22 y.o. male with the following -     Problem List Items Addressed This Visit       Hypertriglyceridemia     Labs ordered for next visit. Follow up in 3 months.   Screening for T2DM and thyroid also ordered    Consider fenofibrate in the future if TG still elevated despite statin, diet and lifestyle modifications         Relevant Orders    Lipid Profile     Other Visit Diagnoses       Family history of hypothyroidism        Relevant Orders    TSH WITH REFLEX TO FT4    Screening for diabetes mellitus        Relevant Orders    HEMOGLOBIN A1C

## 2023-07-23 ENCOUNTER — PATIENT MESSAGE (OUTPATIENT)
Dept: OBGYN | Facility: CLINIC | Age: 23
End: 2023-07-23
Payer: COMMERCIAL

## 2023-07-24 RX ORDER — ATORVASTATIN CALCIUM 40 MG/1
40 TABLET, FILM COATED ORAL NIGHTLY
Qty: 90 TABLET | Refills: 3 | Status: SHIPPED | OUTPATIENT
Start: 2023-07-24

## 2023-08-01 ENCOUNTER — OFFICE VISIT (OUTPATIENT)
Dept: MEDICAL GROUP | Facility: CLINIC | Age: 23
End: 2023-08-01
Payer: COMMERCIAL

## 2023-08-01 VITALS — BODY MASS INDEX: 28.58 KG/M2 | WEIGHT: 204.9 LBS

## 2023-08-01 DIAGNOSIS — E78.1 HYPERTRIGLYCERIDEMIA: ICD-10-CM

## 2023-08-01 DIAGNOSIS — R07.89 CHEST WALL DISCOMFORT: ICD-10-CM

## 2023-08-01 PROCEDURE — 99213 OFFICE O/P EST LOW 20 MIN: CPT | Mod: GE

## 2023-08-01 ASSESSMENT — FIBROSIS 4 INDEX: FIB4 SCORE: 0.46

## 2023-08-01 NOTE — PROGRESS NOTES
Subjective:     CC: lab follow-up and chest discomfort     HPI:   Atilio presents today with follow-up on labs for his hypertriglyceridemia and complaints of chest discomfort ongoing for 3-4 years.     Patient was diagnosed with hypertriglyceridemia in September 2022.  At that time his triglycerides were elevated to 792 and he was started on atorvastatin 40 mg.  Reports in September 2022 he has not been taking his atorvastatin regularly.  He states that he would take it every other day to once a week.  States that after his last visit in June 2023 patient was taking medication daily.  He will take the medication in the morning on an empty stomach.  Patient said that he has stopped taking his medication 1 week ago after he had his labs completed on July 17, 2023.  Reports that he stopped taking the medications due to symptoms of nausea vomiting and muscle aches that he experienced during the month of taking it consistently.  Patient states that he needs a doctor note for missing work due to these symptoms.     Patient reports that he has been trying to make lifestyle changes by eating healthier and exercising. He has lost 9 lbs in the last month. However, patient reports that he is only eating one meal a day at dinner time, consisting of meat, vegetable and carbohydrate. He states he rarely is eating snacks and when he does it is often mixed nuts or a bag of chips. Patient does not recall how long he has only been eating one meal.     Patient reports a chest discomfort. He states that he is having popping of his ribs around the sternum with associated pain. It has been ongoing for 3-4 years. Reports trauma of falling onto open light source 4 years ago. Reports that rubbing his chest and stretching relieves the pain. Has not seen a doctor for it.  Reports that laying on his chest makes it worse. Denies chest pain, palpitations, radiation of pain or pain with activity.      Current Outpatient Medications Ordered in  Epic   Medication Sig Dispense Refill    vitamin D2, Ergocalciferol, (DRISDOL) 1.25 MG (46587 UT) Cap capsule Take 1 Capsule by mouth every 7 days. 12 Capsule 3    albuterol 108 (90 Base) MCG/ACT Aero Soln inhalation aerosol Inhale 2 Puffs every four hours as needed for Shortness of Breath. 1 Each 3    rizatriptan (MAXALT) 10 MG tablet 1 BY MOUTH AS NEEDED MIGRAINE 9 Tablet 1    atorvastatin (LIPITOR) 40 MG Tab Take 1 Tablet by mouth every evening. (Patient not taking: Reported on 2023) 90 Tablet 3    cetirizine (ZYRTEC) 10 MG Tab Take 1 Tablet by mouth every day. (Patient not taking: Reported on 2023) 90 Tablet 3    ibuprofen (MOTRIN) 800 MG Tab  (Patient not taking: Reported on 2023)       No current [facility]-ordered facility-administered medications on file.     Family History   Problem Relation Age of Onset    Hypertension Mother     Hyperlipidemia Father     Colon Cancer Father     Hyperlipidemia Maternal Grandmother     Hypertension Maternal Grandmother     Hyperlipidemia Maternal Grandfather     Hypertension Maternal Grandfather       Past Surgical History:   Procedure Laterality Date    TONSILLECTOMY AND ADENOIDECTOMY  2014    Performed by Miguel Oden M.D. at [facility]    OTHER  2007    Hypospadias repair      Social History     Tobacco Use    Smoking status: Former     Packs/day: 0.50     Types: Cigarettes     Quit date: 2019     Years since quittin.5   Vaping Use    Vaping Use: Some days    Substances: Nicotine   Substance Use Topics    Alcohol use: Yes     Alcohol/week: 2.4 oz     Types: 4 Standard drinks or equivalent per week     Comment: social    Drug use: Yes     Frequency: 6.0 times per week     Types: Marijuana     Comment: smoking        ROS:  Gen: no fevers/chills, no changes in weight  Pulm: no sob, no cough  CV: no chest pain, np palpitations  GI: no nausea/vomiting, no diarrhea    Objective:     Exam:  BP (P) 104/68 (BP Location: Left arm, Patient  "Position: Sitting, BP Cuff Size: Adult)   Pulse (!) (P) 55   Temp (P) 36.3 °C (97.4 °F) (Temporal)   Resp (P) 16   Ht (P) 1.803 m (5' 11\")   Wt 92.9 kg (204 lb 14.4 oz)   SpO2 (P) 96%   BMI (P) 28.58 kg/m²  Body mass index is 28.58 kg/m² (pended).    Gen: Alert and oriented, No apparent distress.  Neck: Neck is supple without lymphadenopathy.  Lungs: Normal effort, CTA bilaterally, no wheezes, rhonchi, or rales  CV: Regular rate and rhythm. No murmurs, rubs, or gallops.  Chest:  No deformities present, pain not present on exam today   Ext: No clubbing, cyanosis, edema.    Labs:   7/19/23   Total cholesterol 88  Triglycerides 137   HDL 27   VLDL 24   LDL 37     A1C  5.4    TSH 0.896    Assessment & Plan:     22 y.o. male with the following -       Chest wall discomfort  Patient reports chest discomfort at sternum when laying down or at random times for 3-4 years. History of trauma to the area 4 years ago with no follow-up. Patient denies chest pain, radiation of pain, palpitations or pain with activity. Pain is likely musculoskeletal in origin.  - Discussed continue stretching for discomfort relief   - Can use over the counter Voltaren gel as needed for discomfort due to likely musculoskeletal origin   - Patient declined chest x-ray      Hypertriglyceridemia  Triglycerides 137 on the 7/19/23. Patient has not been consistently taking atorvastatin in the past. Did take consistently for one month. Is taking it in the morning on an empty stomach.   - Continue taking atorvastatin 40 mg, discussed taking it in the evening after a meal   - Patient does not need fenofibrate as triglycerides are managed with daily atorvastatin use  - Discussed lifestyle and diet modifications, food log to encourage patient to eat more than one meal a day   - Follow-up in 2 months to review medication tolerance and diet        Return in about 2 months (around 10/1/2023).    Destinee Lara M.D.  PGY1          "

## 2023-08-01 NOTE — LETTER
August 1, 2023    To Whom It May Concern:         This is confirmation that Atilio NIÑO Marvel COVARRUBIAS attended his scheduled appointment with Destinee Lara M.D. on 8/01/23. Patient was sick on 7/30/23 and 7/31. He can return to work on 8/2/23.          If you have any questions please do not hesitate to call me at the phone number listed below.    Sincerely,          Destinee Lara M.D.  [phone]

## 2023-08-01 NOTE — ASSESSMENT & PLAN NOTE
Patient reports chest discomfort at sternum when laying down or at random times for 3-4 years. History of trauma to the area 4 years ago with no follow-up. Patient denies chest pain, radiation of pain, palpitations or pain with activity. Pain is likely musculoskeletal in origin.  - Discussed continue stretching for discomfort relief   - Can use over the counter Voltaren gel as needed for discomfort due to likely musculoskeletal origin   - Patient declined chest x-ray

## 2023-08-01 NOTE — ASSESSMENT & PLAN NOTE
Triglycerides 137 on the 7/19/23. Patient has not been consistently taking atorvastatin in the past. Did take consistently for one month. Is taking it in the morning on an empty stomach.   - Continue taking atorvastatin 40 mg, discussed taking it in the evening after a meal   - Patient does not need fenofibrate as triglycerides are managed with daily atorvastatin use  - Discussed lifestyle and diet modifications, food log to encourage patient to eat more than one meal a day   - Follow-up in 2 months to review medication tolerance and diet

## 2023-08-01 NOTE — PATIENT INSTRUCTIONS
Keep food log for breakfast, lunch, dinner and snacks. Write in log what foods are upsetting stomach     Continue taking atorvastatin 40 mg in the evening after eating

## 2023-08-22 ENCOUNTER — HOSPITAL ENCOUNTER (OUTPATIENT)
Dept: LAB | Facility: MEDICAL CENTER | Age: 23
End: 2023-08-22
Attending: FAMILY MEDICINE
Payer: COMMERCIAL

## 2023-08-22 LAB
ALBUMIN SERPL BCP-MCNC: 5 G/DL (ref 3.2–4.9)
ALBUMIN/GLOB SERPL: 1.9 G/DL
ALP SERPL-CCNC: 77 U/L (ref 30–99)
ALT SERPL-CCNC: 21 U/L (ref 2–50)
ANION GAP SERPL CALC-SCNC: 11 MMOL/L (ref 7–16)
AST SERPL-CCNC: 18 U/L (ref 12–45)
BASOPHILS # BLD AUTO: 0.4 % (ref 0–1.8)
BASOPHILS # BLD: 0.03 K/UL (ref 0–0.12)
BILIRUB SERPL-MCNC: 0.8 MG/DL (ref 0.1–1.5)
BUN SERPL-MCNC: 16 MG/DL (ref 8–22)
CALCIUM ALBUM COR SERPL-MCNC: 9.1 MG/DL (ref 8.5–10.5)
CALCIUM SERPL-MCNC: 9.9 MG/DL (ref 8.5–10.5)
CHLORIDE SERPL-SCNC: 101 MMOL/L (ref 96–112)
CO2 SERPL-SCNC: 28 MMOL/L (ref 20–33)
CREAT SERPL-MCNC: 0.89 MG/DL (ref 0.5–1.4)
CRP SERPL HS-MCNC: <0.3 MG/DL (ref 0–0.75)
EOSINOPHIL # BLD AUTO: 0.12 K/UL (ref 0–0.51)
EOSINOPHIL NFR BLD: 1.7 % (ref 0–6.9)
ERYTHROCYTE [DISTWIDTH] IN BLOOD BY AUTOMATED COUNT: 43.5 FL (ref 35.9–50)
ERYTHROCYTE [SEDIMENTATION RATE] IN BLOOD BY WESTERGREN METHOD: 6 MM/HOUR (ref 0–20)
GFR SERPLBLD CREATININE-BSD FMLA CKD-EPI: 124 ML/MIN/1.73 M 2
GLOBULIN SER CALC-MCNC: 2.6 G/DL (ref 1.9–3.5)
GLUCOSE SERPL-MCNC: 91 MG/DL (ref 65–99)
HCT VFR BLD AUTO: 48.4 % (ref 42–52)
HGB BLD-MCNC: 15.8 G/DL (ref 14–18)
IMM GRANULOCYTES # BLD AUTO: 0.03 K/UL (ref 0–0.11)
IMM GRANULOCYTES NFR BLD AUTO: 0.4 % (ref 0–0.9)
LYMPHOCYTES # BLD AUTO: 1.6 K/UL (ref 1–4.8)
LYMPHOCYTES NFR BLD: 22.6 % (ref 22–41)
MCH RBC QN AUTO: 30.4 PG (ref 27–33)
MCHC RBC AUTO-ENTMCNC: 32.6 G/DL (ref 32.3–36.5)
MCV RBC AUTO: 93.3 FL (ref 81.4–97.8)
MONOCYTES # BLD AUTO: 0.49 K/UL (ref 0–0.85)
MONOCYTES NFR BLD AUTO: 6.9 % (ref 0–13.4)
NEUTROPHILS # BLD AUTO: 4.8 K/UL (ref 1.82–7.42)
NEUTROPHILS NFR BLD: 68 % (ref 44–72)
NRBC # BLD AUTO: 0 K/UL
NRBC BLD-RTO: 0 /100 WBC (ref 0–0.2)
PLATELET # BLD AUTO: 248 K/UL (ref 164–446)
PMV BLD AUTO: 12.3 FL (ref 9–12.9)
POTASSIUM SERPL-SCNC: 4.3 MMOL/L (ref 3.6–5.5)
PROT SERPL-MCNC: 7.6 G/DL (ref 6–8.2)
RBC # BLD AUTO: 5.19 M/UL (ref 4.7–6.1)
SODIUM SERPL-SCNC: 140 MMOL/L (ref 135–145)
WBC # BLD AUTO: 7.1 K/UL (ref 4.8–10.8)

## 2023-08-22 PROCEDURE — 85025 COMPLETE CBC W/AUTO DIFF WBC: CPT

## 2023-08-22 PROCEDURE — 80053 COMPREHEN METABOLIC PANEL: CPT

## 2023-08-22 PROCEDURE — 86140 C-REACTIVE PROTEIN: CPT

## 2023-08-22 PROCEDURE — 36415 COLL VENOUS BLD VENIPUNCTURE: CPT

## 2023-08-22 PROCEDURE — 86364 TISS TRNSGLTMNASE EA IG CLAS: CPT

## 2023-08-22 PROCEDURE — 85652 RBC SED RATE AUTOMATED: CPT

## 2023-08-22 PROCEDURE — 83036 HEMOGLOBIN GLYCOSYLATED A1C: CPT

## 2023-08-22 PROCEDURE — 82784 ASSAY IGA/IGD/IGG/IGM EACH: CPT

## 2023-08-23 LAB
EST. AVERAGE GLUCOSE BLD GHB EST-MCNC: 105 MG/DL
HBA1C MFR BLD: 5.3 % (ref 4–5.6)

## 2023-08-24 LAB — IGA SERPL-MCNC: 207 MG/DL (ref 68–408)

## 2023-08-25 LAB — TTG IGA SER IA-ACNC: <2 U/ML (ref 0–3)

## 2024-03-24 ENCOUNTER — HOSPITAL ENCOUNTER (OUTPATIENT)
Dept: RADIOLOGY | Facility: MEDICAL CENTER | Age: 24
End: 2024-03-24
Attending: FAMILY MEDICINE
Payer: COMMERCIAL

## 2024-03-24 ENCOUNTER — HOSPITAL ENCOUNTER (EMERGENCY)
Facility: MEDICAL CENTER | Age: 24
End: 2024-03-24
Attending: EMERGENCY MEDICINE
Payer: COMMERCIAL

## 2024-03-24 VITALS
OXYGEN SATURATION: 97 % | BODY MASS INDEX: 27.75 KG/M2 | SYSTOLIC BLOOD PRESSURE: 127 MMHG | HEIGHT: 71 IN | HEART RATE: 51 BPM | RESPIRATION RATE: 18 BRPM | TEMPERATURE: 97.3 F | WEIGHT: 198.19 LBS | DIASTOLIC BLOOD PRESSURE: 72 MMHG

## 2024-03-24 DIAGNOSIS — S86.899A ANTERIOR SHIN SPLINTS: ICD-10-CM

## 2024-03-24 DIAGNOSIS — M43.16 SPONDYLOLISTHESIS OF LUMBAR REGION: ICD-10-CM

## 2024-03-24 DIAGNOSIS — M79.605 PAIN OF LEFT LOWER EXTREMITY: ICD-10-CM

## 2024-03-24 DIAGNOSIS — M54.31 BILATERAL SCIATICA: ICD-10-CM

## 2024-03-24 DIAGNOSIS — M54.32 BILATERAL SCIATICA: ICD-10-CM

## 2024-03-24 PROCEDURE — 700102 HCHG RX REV CODE 250 W/ 637 OVERRIDE(OP): Performed by: EMERGENCY MEDICINE

## 2024-03-24 PROCEDURE — A9270 NON-COVERED ITEM OR SERVICE: HCPCS | Performed by: EMERGENCY MEDICINE

## 2024-03-24 PROCEDURE — 72148 MRI LUMBAR SPINE W/O DYE: CPT

## 2024-03-24 PROCEDURE — 99283 EMERGENCY DEPT VISIT LOW MDM: CPT

## 2024-03-24 RX ORDER — MELOXICAM 7.5 MG/1
7.5 TABLET ORAL DAILY
Qty: 14 TABLET | Refills: 0 | Status: SHIPPED | OUTPATIENT
Start: 2024-03-24

## 2024-03-24 RX ORDER — IBUPROFEN 600 MG/1
600 TABLET ORAL ONCE
Status: COMPLETED | OUTPATIENT
Start: 2024-03-24 | End: 2024-03-24

## 2024-03-24 RX ADMIN — IBUPROFEN 600 MG: 600 TABLET, FILM COATED ORAL at 15:47

## 2024-03-24 ASSESSMENT — FIBROSIS 4 INDEX: FIB4 SCORE: 0.35

## 2024-03-24 NOTE — ED PROVIDER NOTES
ER Provider Note    Scribed for Jason Zarco M.D. by Jenise Vyas. 3/24/2024   3:04 PM    Primary Care Provider: Thuy Lauren M.D.    CHIEF COMPLAINT  Chief Complaint   Patient presents with    Leg Pain     Left shin x 3 days  Denies any recent injuries or illnesses       EXTERNAL RECORDS REVIEWED  The patient has a history of hypertriglyceridemia.     HPI/ROS    OUTSIDE HISTORIAN(S):  Family     Atilio Grier III is a 23 y.o. male who presents to the ED for evaluation of left shin pain onset 3 days ago. He states his pain occasional is up on his left thigh as well when he ambulates. He denies any recent trauma. He notes he went back to work yesterday as a . Per mother, the patient has hyperlipidemia and is concerned he has been non complaint with his medications.    PAST MEDICAL HISTORY  Past Medical History:   Diagnosis Date    Asthma     Dental disorder     loose tooth upper R     Tension headache 8/18/2022       SURGICAL HISTORY  Past Surgical History:   Procedure Laterality Date    TONSILLECTOMY AND ADENOIDECTOMY  7/16/2014    Performed by Miguel Oden M.D. at [facility]    OTHER  2007    Hypospadias repair       FAMILY HISTORY  Family History   Problem Relation Age of Onset    Hypertension Mother     Hyperlipidemia Father     Colon Cancer Father     Hyperlipidemia Maternal Grandmother     Hypertension Maternal Grandmother     Hyperlipidemia Maternal Grandfather     Hypertension Maternal Grandfather        SOCIAL HISTORY   reports that he quit smoking about 5 years ago. His smoking use included cigarettes. He does not have any smokeless tobacco history on file. He reports current alcohol use of about 2.4 oz of alcohol per week. He reports current drug use. Frequency: 6.00 times per week. Drug: Marijuana.    CURRENT MEDICATIONS  Previous Medications    ALBUTEROL 108 (90 BASE) MCG/ACT AERO SOLN INHALATION AEROSOL    Inhale 2 Puffs every four hours as needed for  "Shortness of Breath.    ATORVASTATIN (LIPITOR) 40 MG TAB    Take 1 Tablet by mouth every evening.    CETIRIZINE (ZYRTEC) 10 MG TAB    Take 1 Tablet by mouth every day.    IBUPROFEN (MOTRIN) 800 MG TAB        RIZATRIPTAN (MAXALT) 10 MG TABLET    1 BY MOUTH AS NEEDED MIGRAINE    VITAMIN D2, ERGOCALCIFEROL, (DRISDOL) 1.25 MG (33022 UT) CAP CAPSULE    Take 1 Capsule by mouth every 7 days.       ALLERGIES  Allergies   Allergen Reactions    Grass Pollen(K-O-R-T-Swt Medardo) Itching, Rash, Runny Nose and Swelling    Sagebrush Hives, Itching, Rash, Runny Nose, Shortness of Breath and Swelling        PHYSICAL EXAM  /78   Pulse (!) 50   Temp 35.9 °C (96.7 °F) (Temporal)   Resp 14   Ht 1.803 m (5' 11\")   Wt 89.9 kg (198 lb 3.1 oz)   SpO2 97%   BMI 27.64 kg/m²    Nursing note and vitals reviewed.  Constitutional: Well-developed and well-nourished. No distress.   HENT: Head is normocephalic and atraumatic. Oropharynx is clear and moist without exudate or erythema.   Eyes: Pupils are equal, round, and reactive to light. Conjunctiva are normal.   Cardiovascular: Normal rate and regular rhythm. No murmur heard. Normal radial pulses.  Pulmonary/Chest: Breath sounds normal. No wheezes or rales.   Abdominal: Soft and non-tender. No distention    Musculoskeletal: Tenderness along the lateral aspect of the anterior tibia, no erythema or swelling.  No palpable cords.  No erythema.  Neurological: Awake, alert and oriented to person, place, and time. No focal deficits noted.  Skin: Skin is warm and dry. No rash.   Psychiatric: Normal mood and affect. Appropriate for clinical situation    INITIAL ASSESSMENT AND PLAN    3:04 PM - Patient was evaluated at bedside for left leg pain. I informed the patient that I suspect this is shin splints. He will be discharged home with a prescription for Mobic. He was encouraged to stay of his leg and use crutches to help with this. Patient verbalizes understanding and support with my plan of " care.      ED Observation Status? No; Patient does not meet criteria for ED Observation.        DISPOSITION AND DISCUSSIONS    Escalation of care considered, and ultimately not performed: diagnostic imaging.    Decision tools and prescription drugs considered including, but not limited to: Pain Medications: I considered prescribing narcotic pain medication, however I feel pain should be adequately controlled with over the counter NSAIDs.    The patient will return for new or worsening symptoms and is stable at the time of discharge.    The patient is referred to a primary physician for blood pressure management, diabetic screening, and for all other preventative health concerns.    DISPOSITION:  Patient will be discharged home in stable condition.    FOLLOW UP:  Thuy Lauren M.D.  [address]  [address]  [facility] [id number] [phone]          [facility], Emergency Dept  [address]  [place] [id number] [phone]          OUTPATIENT MEDICATIONS:  New Prescriptions    MELOXICAM (MOBIC) 7.5 MG TAB    Take 1 Tablet by mouth every day.     FINAL DIAGNOSIS  1. Pain of left lower extremity    2. Anterior shin splints         Jenise KAY (Scribe), am scribing for, and in the presence of, Jason Zarco M.D..    Electronically signed by: Jenise Vyas (Sonu), 3/24/2024    Jason KAY M.D. personally performed the services described in this documentation, as scribed by Jenise Vyas in my presence, and it is both accurate and complete.      The note accurately reflects work and decisions made by me.  Jason Zarco M.D.  3/24/2024  3:57 PM

## 2024-03-24 NOTE — ED TRIAGE NOTES
"Chief Complaint   Patient presents with    Leg Pain     Left shin x 3 days  Denies any recent injuries or illnesses       /78   Pulse (!) 50   Temp 35.9 °C (96.7 °F) (Temporal)   Resp 14   Ht 1.803 m (5' 11\")   Wt 89.9 kg (198 lb 3.1 oz)   SpO2 97%   BMI 27.64 kg/m²     Pt ambulated to ED by self for c/o Left shin pain x 3 days, states became more painful last night.    "

## 2024-03-24 NOTE — ED NOTES
Patient used the crutches correctly and discharged home.    Discharge home with instructions, rxn,  and follow up care reviewed and given to patient with verbal understanding.    Family member with patient.

## 2024-04-19 RX ORDER — ERGOCALCIFEROL 1.25 MG/1
CAPSULE ORAL
Qty: 12 CAPSULE | Refills: 2 | Status: SHIPPED | OUTPATIENT
Start: 2024-04-19

## 2024-08-02 ENCOUNTER — HOSPITAL ENCOUNTER (OUTPATIENT)
Dept: LAB | Facility: MEDICAL CENTER | Age: 24
End: 2024-08-02
Attending: FAMILY MEDICINE
Payer: COMMERCIAL

## 2024-08-02 LAB
25(OH)D3 SERPL-MCNC: 36 NG/ML (ref 30–100)
ALBUMIN SERPL BCP-MCNC: 4.7 G/DL (ref 3.2–4.9)
ALBUMIN/GLOB SERPL: 1.7 G/DL
ALP SERPL-CCNC: 66 U/L (ref 30–99)
ALT SERPL-CCNC: 28 U/L (ref 2–50)
AMYLASE SERPL-CCNC: 37 U/L (ref 20–103)
ANION GAP SERPL CALC-SCNC: 11 MMOL/L (ref 7–16)
AST SERPL-CCNC: 22 U/L (ref 12–45)
BASOPHILS # BLD AUTO: 0.7 % (ref 0–1.8)
BASOPHILS # BLD: 0.04 K/UL (ref 0–0.12)
BILIRUB SERPL-MCNC: <0.2 MG/DL (ref 0.1–1.5)
BUN SERPL-MCNC: 18 MG/DL (ref 8–22)
CALCIUM ALBUM COR SERPL-MCNC: 8.8 MG/DL (ref 8.5–10.5)
CALCIUM SERPL-MCNC: 9.4 MG/DL (ref 8.5–10.5)
CHLORIDE SERPL-SCNC: 105 MMOL/L (ref 96–112)
CHOLEST SERPL-MCNC: 125 MG/DL (ref 100–199)
CO2 SERPL-SCNC: 24 MMOL/L (ref 20–33)
CREAT SERPL-MCNC: 0.76 MG/DL (ref 0.5–1.4)
EOSINOPHIL # BLD AUTO: 0.13 K/UL (ref 0–0.51)
EOSINOPHIL NFR BLD: 2.3 % (ref 0–6.9)
ERYTHROCYTE [DISTWIDTH] IN BLOOD BY AUTOMATED COUNT: 41.1 FL (ref 35.9–50)
EST. AVERAGE GLUCOSE BLD GHB EST-MCNC: 105 MG/DL
FERRITIN SERPL-MCNC: 215 NG/ML (ref 22–322)
FOLATE SERPL-MCNC: 33.4 NG/ML
GFR SERPLBLD CREATININE-BSD FMLA CKD-EPI: 130 ML/MIN/1.73 M 2
GLOBULIN SER CALC-MCNC: 2.8 G/DL (ref 1.9–3.5)
GLUCOSE SERPL-MCNC: 99 MG/DL (ref 65–99)
HBA1C MFR BLD: 5.3 % (ref 4–5.6)
HCT VFR BLD AUTO: 48 % (ref 42–52)
HDLC SERPL-MCNC: 31 MG/DL
HGB BLD-MCNC: 16.4 G/DL (ref 14–18)
IMM GRANULOCYTES # BLD AUTO: 0.04 K/UL (ref 0–0.11)
IMM GRANULOCYTES NFR BLD AUTO: 0.7 % (ref 0–0.9)
LDLC SERPL CALC-MCNC: 52 MG/DL
LIPASE SERPL-CCNC: 17 U/L (ref 11–82)
LYMPHOCYTES # BLD AUTO: 1.95 K/UL (ref 1–4.8)
LYMPHOCYTES NFR BLD: 34.6 % (ref 22–41)
MAGNESIUM SERPL-MCNC: 2.1 MG/DL (ref 1.5–2.5)
MCH RBC QN AUTO: 31.5 PG (ref 27–33)
MCHC RBC AUTO-ENTMCNC: 34.2 G/DL (ref 32.3–36.5)
MCV RBC AUTO: 92.1 FL (ref 81.4–97.8)
MONOCYTES # BLD AUTO: 0.34 K/UL (ref 0–0.85)
MONOCYTES NFR BLD AUTO: 6 % (ref 0–13.4)
NEUTROPHILS # BLD AUTO: 3.14 K/UL (ref 1.82–7.42)
NEUTROPHILS NFR BLD: 55.7 % (ref 44–72)
NRBC # BLD AUTO: 0 K/UL
NRBC BLD-RTO: 0 /100 WBC (ref 0–0.2)
PHOSPHATE SERPL-MCNC: 2.4 MG/DL (ref 2.5–4.5)
PLATELET # BLD AUTO: 239 K/UL (ref 164–446)
PMV BLD AUTO: 12 FL (ref 9–12.9)
POTASSIUM SERPL-SCNC: 4.6 MMOL/L (ref 3.6–5.5)
PROT SERPL-MCNC: 7.5 G/DL (ref 6–8.2)
RBC # BLD AUTO: 5.21 M/UL (ref 4.7–6.1)
SODIUM SERPL-SCNC: 140 MMOL/L (ref 135–145)
TRIGL SERPL-MCNC: 210 MG/DL (ref 0–149)
TSH SERPL-ACNC: 0.89 UIU/ML (ref 0.35–5.5)
VIT B12 SERPL-MCNC: 875 PG/ML (ref 211–911)
WBC # BLD AUTO: 5.6 K/UL (ref 4.8–10.8)

## 2024-08-02 PROCEDURE — 82306 VITAMIN D 25 HYDROXY: CPT

## 2024-08-02 PROCEDURE — 82728 ASSAY OF FERRITIN: CPT

## 2024-08-02 PROCEDURE — 36415 COLL VENOUS BLD VENIPUNCTURE: CPT

## 2024-08-02 PROCEDURE — 84100 ASSAY OF PHOSPHORUS: CPT

## 2024-08-02 PROCEDURE — 82607 VITAMIN B-12: CPT

## 2024-08-02 PROCEDURE — 83690 ASSAY OF LIPASE: CPT

## 2024-08-02 PROCEDURE — 84439 ASSAY OF FREE THYROXINE: CPT

## 2024-08-02 PROCEDURE — 85025 COMPLETE CBC W/AUTO DIFF WBC: CPT

## 2024-08-02 PROCEDURE — 84443 ASSAY THYROID STIM HORMONE: CPT

## 2024-08-02 PROCEDURE — 83036 HEMOGLOBIN GLYCOSYLATED A1C: CPT

## 2024-08-02 PROCEDURE — 80061 LIPID PANEL: CPT

## 2024-08-02 PROCEDURE — 80053 COMPREHEN METABOLIC PANEL: CPT

## 2024-08-02 PROCEDURE — 82150 ASSAY OF AMYLASE: CPT

## 2024-08-02 PROCEDURE — 82746 ASSAY OF FOLIC ACID SERUM: CPT

## 2024-08-02 PROCEDURE — 83735 ASSAY OF MAGNESIUM: CPT

## 2024-08-08 LAB — T4 FREE SERPL DIALY-MCNC: 1.5 NG/DL (ref 1.1–2.4)

## 2024-12-05 ENCOUNTER — HOSPITAL ENCOUNTER (OUTPATIENT)
Facility: MEDICAL CENTER | Age: 24
End: 2024-12-05
Attending: FAMILY MEDICINE
Payer: COMMERCIAL

## 2024-12-05 PROCEDURE — 86038 ANTINUCLEAR ANTIBODIES: CPT

## 2024-12-05 PROCEDURE — 86431 RHEUMATOID FACTOR QUANT: CPT

## 2024-12-05 PROCEDURE — 36415 COLL VENOUS BLD VENIPUNCTURE: CPT

## 2024-12-05 PROCEDURE — 86812 HLA TYPING A B OR C: CPT

## 2024-12-05 PROCEDURE — 86200 CCP ANTIBODY: CPT

## 2024-12-07 LAB
HLA-B27 QL FC: NEGATIVE
NUCLEAR IGG SER QL IA: NORMAL

## 2024-12-09 LAB
CCP IGA+IGG SERPL IA-ACNC: 3 UNITS (ref 0–19)
RHEUMATOID FACT SER IA-ACNC: <10 IU/ML (ref 0–14)

## 2025-03-06 ENCOUNTER — HOSPITAL ENCOUNTER (OUTPATIENT)
Dept: LAB | Facility: MEDICAL CENTER | Age: 25
End: 2025-03-06
Attending: FAMILY MEDICINE
Payer: COMMERCIAL

## 2025-03-06 LAB
25(OH)D3 SERPL-MCNC: 23 NG/ML (ref 30–100)
ALBUMIN SERPL BCP-MCNC: 4.7 G/DL (ref 3.2–4.9)
ALBUMIN/GLOB SERPL: 1.6 G/DL
ALP SERPL-CCNC: 68 U/L (ref 30–99)
ALT SERPL-CCNC: 21 U/L (ref 2–50)
AMYLASE SERPL-CCNC: 32 U/L (ref 20–103)
ANION GAP SERPL CALC-SCNC: 12 MMOL/L (ref 7–16)
AST SERPL-CCNC: 20 U/L (ref 12–45)
BASOPHILS # BLD AUTO: 0.8 % (ref 0–1.8)
BASOPHILS # BLD: 0.04 K/UL (ref 0–0.12)
BILIRUB SERPL-MCNC: 0.4 MG/DL (ref 0.1–1.5)
BUN SERPL-MCNC: 14 MG/DL (ref 8–22)
CALCIUM ALBUM COR SERPL-MCNC: 8.8 MG/DL (ref 8.5–10.5)
CALCIUM SERPL-MCNC: 9.4 MG/DL (ref 8.5–10.5)
CHLORIDE SERPL-SCNC: 104 MMOL/L (ref 96–112)
CHOLEST SERPL-MCNC: 165 MG/DL (ref 100–199)
CO2 SERPL-SCNC: 24 MMOL/L (ref 20–33)
CREAT SERPL-MCNC: 0.92 MG/DL (ref 0.5–1.4)
EOSINOPHIL # BLD AUTO: 0.26 K/UL (ref 0–0.51)
EOSINOPHIL NFR BLD: 4.9 % (ref 0–6.9)
ERYTHROCYTE [DISTWIDTH] IN BLOOD BY AUTOMATED COUNT: 43.9 FL (ref 35.9–50)
EST. AVERAGE GLUCOSE BLD GHB EST-MCNC: 108 MG/DL
FERRITIN SERPL-MCNC: 216 NG/ML (ref 22–322)
FOLATE SERPL-MCNC: 11 NG/ML
GFR SERPLBLD CREATININE-BSD FMLA CKD-EPI: 120 ML/MIN/1.73 M 2
GLOBULIN SER CALC-MCNC: 3 G/DL (ref 1.9–3.5)
GLUCOSE SERPL-MCNC: 83 MG/DL (ref 65–99)
HBA1C MFR BLD: 5.4 % (ref 4–5.6)
HCT VFR BLD AUTO: 49.7 % (ref 42–52)
HDLC SERPL-MCNC: 32 MG/DL
HGB BLD-MCNC: 16.4 G/DL (ref 14–18)
IMM GRANULOCYTES # BLD AUTO: 0.02 K/UL (ref 0–0.11)
IMM GRANULOCYTES NFR BLD AUTO: 0.4 % (ref 0–0.9)
IRON SATN MFR SERPL: 22 % (ref 15–55)
IRON SERPL-MCNC: 73 UG/DL (ref 50–180)
LDLC SERPL CALC-MCNC: 108 MG/DL
LIPASE SERPL-CCNC: 20 U/L (ref 11–82)
LYMPHOCYTES # BLD AUTO: 1.7 K/UL (ref 1–4.8)
LYMPHOCYTES NFR BLD: 31.9 % (ref 22–41)
MAGNESIUM SERPL-MCNC: 2.2 MG/DL (ref 1.5–2.5)
MCH RBC QN AUTO: 30.9 PG (ref 27–33)
MCHC RBC AUTO-ENTMCNC: 33 G/DL (ref 32.3–36.5)
MCV RBC AUTO: 93.6 FL (ref 81.4–97.8)
MONOCYTES # BLD AUTO: 0.44 K/UL (ref 0–0.85)
MONOCYTES NFR BLD AUTO: 8.3 % (ref 0–13.4)
NEUTROPHILS # BLD AUTO: 2.87 K/UL (ref 1.82–7.42)
NEUTROPHILS NFR BLD: 53.7 % (ref 44–72)
NRBC # BLD AUTO: 0 K/UL
NRBC BLD-RTO: 0 /100 WBC (ref 0–0.2)
PHOSPHATE SERPL-MCNC: 2.8 MG/DL (ref 2.5–4.5)
PLATELET # BLD AUTO: 237 K/UL (ref 164–446)
PMV BLD AUTO: 11.9 FL (ref 9–12.9)
POTASSIUM SERPL-SCNC: 3.8 MMOL/L (ref 3.6–5.5)
PROT SERPL-MCNC: 7.7 G/DL (ref 6–8.2)
RBC # BLD AUTO: 5.31 M/UL (ref 4.7–6.1)
SODIUM SERPL-SCNC: 140 MMOL/L (ref 135–145)
TIBC SERPL-MCNC: 333 UG/DL (ref 250–450)
TRIGL SERPL-MCNC: 125 MG/DL (ref 0–149)
TSH SERPL-ACNC: 1.23 UIU/ML (ref 0.35–5.5)
UIBC SERPL-MCNC: 260 UG/DL (ref 110–370)
VIT B12 SERPL-MCNC: 689 PG/ML (ref 211–911)
WBC # BLD AUTO: 5.3 K/UL (ref 4.8–10.8)

## 2025-03-06 PROCEDURE — 85025 COMPLETE CBC W/AUTO DIFF WBC: CPT

## 2025-03-06 PROCEDURE — 83690 ASSAY OF LIPASE: CPT

## 2025-03-06 PROCEDURE — 82746 ASSAY OF FOLIC ACID SERUM: CPT

## 2025-03-06 PROCEDURE — 82728 ASSAY OF FERRITIN: CPT

## 2025-03-06 PROCEDURE — 83550 IRON BINDING TEST: CPT

## 2025-03-06 PROCEDURE — 80053 COMPREHEN METABOLIC PANEL: CPT

## 2025-03-06 PROCEDURE — 84100 ASSAY OF PHOSPHORUS: CPT

## 2025-03-06 PROCEDURE — 83540 ASSAY OF IRON: CPT

## 2025-03-06 PROCEDURE — 84439 ASSAY OF FREE THYROXINE: CPT

## 2025-03-06 PROCEDURE — 83036 HEMOGLOBIN GLYCOSYLATED A1C: CPT

## 2025-03-06 PROCEDURE — 83735 ASSAY OF MAGNESIUM: CPT

## 2025-03-06 PROCEDURE — 36415 COLL VENOUS BLD VENIPUNCTURE: CPT

## 2025-03-06 PROCEDURE — 82150 ASSAY OF AMYLASE: CPT

## 2025-03-06 PROCEDURE — 84443 ASSAY THYROID STIM HORMONE: CPT

## 2025-03-06 PROCEDURE — 82306 VITAMIN D 25 HYDROXY: CPT

## 2025-03-06 PROCEDURE — 82607 VITAMIN B-12: CPT

## 2025-03-06 PROCEDURE — 80061 LIPID PANEL: CPT

## 2025-03-10 LAB — T4 FREE SERPL DIALY-MCNC: 2.3 NG/DL (ref 1.1–2.4)

## 2025-03-20 ENCOUNTER — PATIENT MESSAGE (OUTPATIENT)
Dept: SCHEDULING | Facility: IMAGING CENTER | Age: 25
End: 2025-03-20
Payer: COMMERCIAL

## 2025-03-20 ENCOUNTER — TELEPHONE (OUTPATIENT)
Dept: HEALTH INFORMATION MANAGEMENT | Facility: OTHER | Age: 25
End: 2025-03-20
Payer: COMMERCIAL

## 2025-03-27 ENCOUNTER — APPOINTMENT (OUTPATIENT)
Dept: PHYSICAL MEDICINE AND REHAB | Facility: MEDICAL CENTER | Age: 25
End: 2025-03-27
Payer: COMMERCIAL

## 2025-05-01 RX ORDER — ERGOCALCIFEROL 1.25 MG/1
CAPSULE, LIQUID FILLED ORAL
Qty: 12 CAPSULE | Refills: 5 | Status: SHIPPED | OUTPATIENT
Start: 2025-05-01

## 2025-07-01 NOTE — ED NOTES
"Chief Complaint   Patient presents with   • Suicidal Ideation     pt denies any at this time but states that he told his therapist today that he was going to cut himself     Pt brought in by father with above complaints. Per father pt \"pulled a 51/50 at his therapists office, so now we are here\". Pt is withdrawn when asked questions, denies SI at this time but states that he told his therapist that he was going to cut himself. Therapist previously called this RN and stated that he was worried that he \"is a flight risk\". All potentially hazardous items removed from room. Belongings at nurses station. Chart up for ERP  " Nettie from [facility]  called to inform Dr Charles that patient is declining speech therapy at this time.    She will faxing notes over to confirm this.